# Patient Record
Sex: FEMALE | Race: BLACK OR AFRICAN AMERICAN | URBAN - METROPOLITAN AREA
[De-identification: names, ages, dates, MRNs, and addresses within clinical notes are randomized per-mention and may not be internally consistent; named-entity substitution may affect disease eponyms.]

---

## 2019-09-30 LAB — PAP SMEAR, EXTERNAL: NORMAL

## 2020-09-03 VITALS
BODY MASS INDEX: 21.66 KG/M2 | HEIGHT: 67 IN | HEART RATE: 81 BPM | SYSTOLIC BLOOD PRESSURE: 142 MMHG | WEIGHT: 138 LBS | DIASTOLIC BLOOD PRESSURE: 87 MMHG

## 2020-09-03 PROBLEM — F41.8 MIXED ANXIETY AND DEPRESSIVE DISORDER: Status: ACTIVE | Noted: 2020-09-03

## 2020-09-03 RX ORDER — PANTOPRAZOLE SODIUM 40 MG/1
40 TABLET, DELAYED RELEASE ORAL DAILY
COMMUNITY
End: 2022-10-20 | Stop reason: ALTCHOICE

## 2020-09-03 RX ORDER — SERTRALINE HYDROCHLORIDE 100 MG/1
TABLET, FILM COATED ORAL DAILY
COMMUNITY

## 2022-03-18 PROBLEM — F41.8 MIXED ANXIETY AND DEPRESSIVE DISORDER: Status: ACTIVE | Noted: 2020-09-03

## 2022-10-20 PROBLEM — N91.2 AMENORRHEA: Status: ACTIVE | Noted: 2022-10-20

## 2022-10-20 PROBLEM — L68.0 HIRSUTISM: Status: ACTIVE | Noted: 2022-10-20

## 2022-10-20 PROBLEM — K58.9 IRRITABLE BOWEL SYNDROME: Status: ACTIVE | Noted: 2018-07-18

## 2022-10-20 PROBLEM — L70.0 ACNE VULGARIS: Status: ACTIVE | Noted: 2022-10-20

## 2022-10-20 PROBLEM — L81.0 POST-INFLAMMATORY HYPERPIGMENTATION: Status: RESOLVED | Noted: 2022-10-20 | Resolved: 2022-10-20

## 2022-10-20 PROBLEM — L81.0 POST-INFLAMMATORY HYPERPIGMENTATION: Status: ACTIVE | Noted: 2022-10-20

## 2022-10-20 RX ORDER — OMEPRAZOLE 20 MG/1
20 CAPSULE, DELAYED RELEASE ORAL DAILY
COMMUNITY
End: 2022-10-21 | Stop reason: ALTCHOICE

## 2022-10-20 RX ORDER — NAPROXEN 500 MG/1
500 TABLET ORAL 2 TIMES DAILY
COMMUNITY
Start: 2022-04-28 | End: 2022-10-21 | Stop reason: ALTCHOICE

## 2022-10-20 NOTE — PROGRESS NOTES
Marcus Gómez is a 28 y.o. female is seen on 10/21/2022 for Establish Care and Strain (States to have hx of right sided chest pain, told it was doing to lifting on her job)    Assessment & Plan:     1. Right-sided chest pain  Assessment & Plan:  Persistent, exacerbated with activity  Referral to ortho to rule out musculoskeletal etiology  Orders:  -     CBC WITH AUTOMATED DIFF; Future  -     METABOLIC PANEL, COMPREHENSIVE; Future  -     REFERRAL TO ORTHOPEDICS  2. Anxiety disorder, unspecified type  Assessment & Plan:   Stable, continue management per specialist  Orders:  -     TSH 3RD GENERATION; Future  3. Recurrent major depressive disorder, in partial remission ([facility] [address]  Assessment & Plan:   Stable, continue management per specialist  Orders:  -     TSH 3RD GENERATION; Future  4. Screening for lipid disorders  -     LIPID PANEL; Future  5. Need for hepatitis C screening test  -     HEPATITIS C AB; Future  6. Encounter to establish care    Follow-up and Dispositions    Return in about 4 weeks (around 2022) for physical, lab results.         Subjective:     HPI    Previous PCP: Dr. Shelby Vo  Reason for switching: does not take insurance    Social Hx:  Occupation-   Household- parents  ETOH use- 4 times a week  Recreational drug use- marijuana, almost a month  Tobacco use- not current    Gyn Hx:  Last PAP: 2022  :   Miscarriage:0  : 0  Date of LMP: Last month, end of month  Hx of STDs: none  Birth Control: sprintec  Menopause:    Family Hx:  HTN- mother, MGM, PGM, MGF  Diabetes- MGM, MGF, PGM, PGF  HLD- none  MI- MGM, MGF, PGM, PGF  Stroke - none  Cancer- none  Mental Health Disorder- none  Autoimmune Disease- none    Preventive:  COVID-19 vac - received  Flu vaccine- declined  Tetanus vaccine- can't remember  MyChart activation - activated    Medical History/Health Concerns:    Right sided chest pain  Onset: last October  Location: right sternal border to breast  Duration: comes and goes  Characteristics: from lifting from her job,   Associated symptoms: no shortness of breath, 7/10  Aggravating factors:constantly lifting  Relieving factors: rest  Treatment: tries to take aleve, but does not work    Anxiety-  Patient is seen for anxiety  Associated symptoms:  see SUNG-7  Current treatment: sertraline 100 mg  Prescribed by possible psychiatry, unsure what provider is, seen during virtual visits  SUNG 2/7 10/21/2022   Feeling nervous, anxious, or on edge 1   Not being able to stop or control worrying 0   Worrying too much about different things 1   Trouble relaxing 0   Being so restless that it is hard to sit still 0   Becoming easily annoyed or irritable 1   Feeling afraid as if something awful might happen 0   SUNG-7 Total Score 3       Depression-  Patient is seen for followup of depression.    Ongoing symptoms include:  see PHQ-9  Current treatment: sertraline 100 mg daily  3 most recent PHQ Screens 10/21/2022   Little interest or pleasure in doing things More than half the days   Feeling down, depressed, irritable, or hopeless Several days   Total Score PHQ 2 3   Trouble falling or staying asleep, or sleeping too much Not at all   Feeling tired or having little energy Several days   Poor appetite, weight loss, or overeating Not at all   Feeling bad about yourself - or that you are a failure or have let yourself or your family down Several days   Trouble concentrating on things such as school, work, reading, or watching TV Not at all   Moving or speaking so slowly that other people could have noticed; or the opposite being so fidgety that others notice Not at all   Thoughts of being better off dead, or hurting yourself in some way Not at all   PHQ 9 Score 5   How difficult have these problems made it for you to do your work, take care of your home and get along with others Somewhat difficult     Review of Systems   Constitutional:  Negative for chills, fever and malaise/fatigue. Respiratory:  Negative for shortness of breath. Cardiovascular:  Negative for chest pain. Musculoskeletal:         Right sided chest pain     Objective:   /74 (BP 1 Location: Right upper arm, BP Patient Position: Sitting, BP Cuff Size: Adult)   Pulse 77   Temp 98.9 °F (37.2 °C) (Oral)   Resp 20   Ht 5' 7\" (1.702 m)   Wt 119 lb (54 kg)   LMP 09/28/2022   SpO2 99%   BMI 18.64 kg/m²     Physical Exam  Vitals and nursing note reviewed. Constitutional:       General: She is not in acute distress. Appearance: She is not ill-appearing. HENT:      Head: Normocephalic and atraumatic. Cardiovascular:      Rate and Rhythm: Normal rate and regular rhythm. Pulmonary:      Effort: Pulmonary effort is normal. No respiratory distress. Breath sounds: No wheezing, rhonchi or rales. Chest:      Chest wall: No mass or tenderness. Skin:     General: Skin is warm and dry. Neurological:      General: No focal deficit present. Mental Status: She is alert and oriented to person, place, and time. Psychiatric:         Mood and Affect: Mood normal.         Thought Content:  Thought content normal.         Judgment: Judgment normal.      MINGO Walters

## 2022-10-21 ENCOUNTER — OFFICE VISIT (OUTPATIENT)
Dept: FAMILY MEDICINE CLINIC | Age: 27
End: 2022-10-21
Payer: MEDICAID

## 2022-10-21 VITALS
RESPIRATION RATE: 20 BRPM | BODY MASS INDEX: 18.68 KG/M2 | SYSTOLIC BLOOD PRESSURE: 111 MMHG | WEIGHT: 119 LBS | OXYGEN SATURATION: 99 % | HEART RATE: 77 BPM | HEIGHT: 67 IN | DIASTOLIC BLOOD PRESSURE: 74 MMHG | TEMPERATURE: 98.9 F

## 2022-10-21 DIAGNOSIS — F33.41 RECURRENT MAJOR DEPRESSIVE DISORDER, IN PARTIAL REMISSION (HCC): ICD-10-CM

## 2022-10-21 DIAGNOSIS — Z11.59 NEED FOR HEPATITIS C SCREENING TEST: ICD-10-CM

## 2022-10-21 DIAGNOSIS — R07.9 RIGHT-SIDED CHEST PAIN: Primary | ICD-10-CM

## 2022-10-21 DIAGNOSIS — Z13.220 SCREENING FOR LIPID DISORDERS: ICD-10-CM

## 2022-10-21 DIAGNOSIS — Z76.89 ENCOUNTER TO ESTABLISH CARE: ICD-10-CM

## 2022-10-21 DIAGNOSIS — F41.9 ANXIETY DISORDER, UNSPECIFIED TYPE: ICD-10-CM

## 2022-10-21 PROBLEM — F32.A DEPRESSION: Status: ACTIVE | Noted: 2022-10-21

## 2022-10-21 PROBLEM — N91.2 AMENORRHEA: Status: RESOLVED | Noted: 2022-10-20 | Resolved: 2022-10-21

## 2022-10-21 PROCEDURE — 99204 OFFICE O/P NEW MOD 45 MIN: CPT

## 2022-10-21 RX ORDER — NORGESTIMATE AND ETHINYL ESTRADIOL 0.25-0.035
KIT ORAL
COMMUNITY
Start: 2022-10-21 | End: 2022-10-21

## 2022-10-21 RX ORDER — NORGESTIMATE AND ETHINYL ESTRADIOL 0.25-0.035
KIT ORAL
COMMUNITY
Start: 2022-10-21

## 2022-10-21 NOTE — PROGRESS NOTES
Glenny Ward presents today for   Chief Complaint   Patient presents with    Establish Care    Strain     States to have hx of right sided chest pain, told it was doing to lifting on her job       Glenny Ward preferred language for health care discussion is english/other. Is someone accompanying this pt? no    Is the patient using any DME equipment during [address]? no    Depression Screening:  3 most recent PHQ Screens 10/21/2022   Little interest or pleasure in doing things More than half the days   Feeling down, depressed, irritable, or hopeless Several days   Total Score PHQ 2 3   Trouble falling or staying asleep, or sleeping too much Not at all   Feeling tired or having little energy Several days   Poor appetite, weight loss, or overeating Not at all   Feeling bad about yourself - or that you are a failure or have let yourself or your family down Several days   Trouble concentrating on things such as school, work, reading, or watching TV Not at all   Moving or speaking so slowly that other people could have noticed; or the opposite being so fidgety that others notice Not at all   Thoughts of being better off dead, or hurting yourself in some way Not at all   PHQ 9 Score 5   How difficult have these problems made it for you to do your work, take care of your home and get along with others Somewhat difficult       Learning Assessment:  Learning Assessment 10/21/2022   PRIMARY LEARNER Patient   PRIMARY LANGUAGE ENGLISH   LEARNER PREFERENCE PRIMARY DEMONSTRATION   ANSWERED BY patient   RELATIONSHIP SELF       Abuse Screening:  Abuse Screening Questionnaire 10/21/2022   Do you ever feel afraid of your partner? N   Are you in a relationship with someone who physically or mentally threatens you? N   Is it safe for you to go home? Y       Generalized Anxiety  No flowsheet data found.       Health Maintenance Due   Topic Date Due    Hepatitis C Screening  Never done    Depression Screen  Never done    COVID-19 Vaccine (1) Never done    Flu Vaccine (1) Never done    Pap Smear  09/30/2022   . Health Maintenance reviewed and discussed and ordered per Provider. Advance Directive:  1. Do you have an advance directive in place?  Patient Reply:no

## 2022-11-03 ENCOUNTER — OFFICE VISIT (OUTPATIENT)
Dept: ORTHOPEDIC SURGERY | Age: 27
End: 2022-11-03
Payer: MEDICAID

## 2022-11-03 VITALS — HEIGHT: 67 IN | BODY MASS INDEX: 18.52 KG/M2 | WEIGHT: 118 LBS | RESPIRATION RATE: 14 BRPM

## 2022-11-03 DIAGNOSIS — M94.0 COSTOCHONDRITIS: Primary | ICD-10-CM

## 2022-11-03 PROCEDURE — 97110 THERAPEUTIC EXERCISES: CPT | Performed by: FAMILY MEDICINE

## 2022-11-03 PROCEDURE — 99204 OFFICE O/P NEW MOD 45 MIN: CPT | Performed by: FAMILY MEDICINE

## 2022-11-03 RX ORDER — DICLOFENAC SODIUM 50 MG/1
50 TABLET, DELAYED RELEASE ORAL 2 TIMES DAILY
Qty: 60 TABLET | Refills: 1 | Status: SHIPPED | OUTPATIENT
Start: 2022-11-03

## 2022-11-03 NOTE — LETTER
11/3/2022    Patient: Ottoniel Castellano   YOB: 1995   Date of Visit: 11/3/2022     Darryle Pick, NP-C  [phone] In Basket    Dear Darryle Pick, NP-C,      Thank you for referring Ms. Ezequiel Ware to [place] [id number]. for evaluation. My notes for this consultation are attached. If you have questions, please do not hesitate to call me. I look forward to following your patient along with you.       Sincerely,    Sharyn Bradley, DO

## 2022-11-03 NOTE — PROGRESS NOTES
HISTORY OF PRESENT ILLNESS    Erich Little 1995 is a 28y.o. year old female comes in today as new patient for: right upper chest pain    Patients symptoms have been present for 1+ years. Pain level 5/10 upper right chest. 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 Patient has tried:  icy hot, aleve without benefit. It is described as pain in chest during/after delivering but resolves after. Past Surgical History:   Procedure Laterality Date    HX HERNIA REPAIR      w/mesh    HX WISDOM TEETH EXTRACTION       Social History     Socioeconomic History    Marital status: SINGLE   Tobacco Use    Smoking status: Former     Types: Cigarettes     Passive exposure: Never    Smokeless tobacco: Never   Vaping Use    Vaping Use: Former   Substance and Sexual Activity    Alcohol use: Yes     Comment: occasional    Drug use: Yes     Types: Marijuana    Sexual activity: Not Currently     Social Determinants of Health     Physical Activity: Sufficiently Active    Days of Exercise per Week: 4 days    Minutes of Exercise per Session: 150+ min      Current Outpatient Medications   Medication Sig Dispense Refill    norgestimate-ethinyl estradioL (ORTHO-CYCLEN, SPRINTEC) 0.25-35 mg-mcg tab 1 tablet      sertraline (ZOLOFT) 100 mg tablet Take  by mouth daily. Past Medical History:   Diagnosis Date    Anxiety     Depression     HSV-1 (herpes simplex virus 1) infection      Family History   Problem Relation Age of Onset    Hypertension Mother     Diabetes Maternal Grandmother     Hypertension Maternal Grandmother     Diabetes Paternal Grandmother     Hypertension Paternal Grandmother          ROS:  No SOB, palps, radiation, N, V      Objective:  Resp 14   Ht 5' 7\" (1.702 m)   Wt 118 lb (53.5 kg)   BMI 18.48 kg/m²   NEURO:  Sensation intact light touch upper and lower extremities. Biceps & Triceps reflexes +2/4 bilaterally.   Patellar and Achilles +2/4  M/S: Examined seating and supine. Spurling negative right. Cervical rotation Normal Rib(s) 3, 4 right TTP and anterior Strength +5/5 Bilateral upper and lower extremities. Assessment/Plan:     ICD-10-CM ICD-9-CM    1. Costochondritis  M94.0 733.6 diclofenac EC (VOLTAREN) 50 mg EC tablet      IA THERAPEUTIC EXERCISES        Therapeutic Exercises:  Deficits: include, but not limited to, loss of strength of integral muscle components and/or ROM loss documented to affected activities Right upper ribs    Target area: Right chest    GOALS: increase/improve flexibility, ROM    I personally went over exercise program with Anuradha Rm. Exercises taught and demonstrated by myself, then performed by patient. Exercise modified as required to optimize benefit based on any potential limits present. Sets/Reps Assigned: Stretches 4 sets daily each held 30+ seconds. Patient (or guardian if minor) verbalizes understanding of evaluation and plan. Will start HEP and Rx for voltaren 50mg  as above and plan follow-up 4 weeks.

## 2022-11-04 ENCOUNTER — HOSPITAL ENCOUNTER (OUTPATIENT)
Dept: LAB | Age: 27
Discharge: HOME | End: 2022-11-04

## 2022-11-04 LAB — XX-LABCORP SPECIMEN COL,LCBCF: NORMAL

## 2022-11-04 PROCEDURE — 99001 SPECIMEN HANDLING PT-LAB: CPT

## 2022-12-01 ENCOUNTER — OFFICE VISIT (OUTPATIENT)
Dept: ORTHOPEDIC SURGERY | Age: 27
End: 2022-12-01
Payer: MEDICAID

## 2022-12-01 VITALS — BODY MASS INDEX: 18.79 KG/M2 | WEIGHT: 120 LBS

## 2022-12-01 DIAGNOSIS — M94.0 COSTOCHONDRITIS: Primary | ICD-10-CM

## 2022-12-01 PROCEDURE — 99213 OFFICE O/P EST LOW 20 MIN: CPT | Performed by: FAMILY MEDICINE

## 2022-12-01 NOTE — PROGRESS NOTES
HISTORY OF PRESENT ILLNESS    Cecil Hernandez 1995 is a 28y.o. year old female comes in today to be evaluated and treated for: right ribs/upper chest pain    Since last appt has noticed pain is resolved after manip. Pain level 0 - No pain/10. Never had to use voltaren 50mg and stretching piror helped. 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 Past Surgical History:   Procedure Laterality Date    HX HERNIA REPAIR      w/mesh    HX WISDOM TEETH EXTRACTION       Social History     Socioeconomic History    Marital status: SINGLE   Tobacco Use    Smoking status: Former     Types: Cigarettes     Passive exposure: Never    Smokeless tobacco: Never   Vaping Use    Vaping Use: Former   Substance and Sexual Activity    Alcohol use: Yes     Comment: occasional    Drug use: Yes     Types: Marijuana    Sexual activity: Not Currently     Social Determinants of Health     Physical Activity: Sufficiently Active    Days of Exercise per Week: 4 days    Minutes of Exercise per Session: 150+ min     Current Outpatient Medications   Medication Sig Dispense Refill    diclofenac EC (VOLTAREN) 50 mg EC tablet Take 1 Tablet by mouth two (2) times a day. 60 Tablet 1    norgestimate-ethinyl estradioL (ORTHO-CYCLEN, SPRINTEC) 0.25-35 mg-mcg tab 1 tablet      sertraline (ZOLOFT) 100 mg tablet Take  by mouth daily. Past Medical History:   Diagnosis Date    Anxiety     Depression     HSV-1 (herpes simplex virus 1) infection      Family History   Problem Relation Age of Onset    Hypertension Mother     Diabetes Maternal Grandmother     Hypertension Maternal Grandmother     Diabetes Paternal Grandmother     Hypertension Paternal Grandmother          ROS:  NO numb    Objective: Wt 120 lb (54.4 kg)   BMI 18.79 kg/m²   NEURO:  Sensation intact light touch upper and lower extremities. Biceps & Triceps reflexes +2/4 bilaterally. Patellar and Achilles +2/4  M/S:  Examined seating and supine.   Spurling negative right.  Cervical rotation Normal Rib(s) 3, 4 right TTP and anterior Strength +5/5 Bilateral upper and lower extremities. Assessment/Plan:     ICD-10-CM ICD-9-CM    1. Costochondritis  M94.0 733.6           Patient (or guardian if minor) verbalizes understanding of evaluation and plan. Will continue current as Sx resolved as above and plan follow-up as needed.

## 2022-12-01 NOTE — LETTER
12/1/2022    Patient: Cecil Hernandez   YOB: 1995   Date of Visit: 12/1/2022     June Duvall, NP-C  [phone]  [address] In Basket    Dear MINGO Castillo,      Thank you for referring Ms. Rose Seen to [facility]. for evaluation. My notes for this consultation are attached. If you have questions, please do not hesitate to call me. I look forward to following your patient along with you.       Sincerely,    Dunia Mohr, DO

## 2024-12-27 ENCOUNTER — OFFICE VISIT (OUTPATIENT)
Facility: CLINIC | Age: 29
End: 2024-12-27

## 2024-12-27 VITALS
SYSTOLIC BLOOD PRESSURE: 132 MMHG | WEIGHT: 132.4 LBS | OXYGEN SATURATION: 97 % | DIASTOLIC BLOOD PRESSURE: 82 MMHG | BODY MASS INDEX: 20.78 KG/M2 | TEMPERATURE: 97.9 F | RESPIRATION RATE: 20 BRPM | HEART RATE: 72 BPM | HEIGHT: 67 IN

## 2024-12-27 DIAGNOSIS — Z00.00 ANNUAL PHYSICAL EXAM: ICD-10-CM

## 2024-12-27 DIAGNOSIS — F40.11 GENERALIZED SOCIAL PHOBIA: Primary | ICD-10-CM

## 2024-12-27 DIAGNOSIS — Z11.4 SCREENING FOR HIV WITHOUT PRESENCE OF RISK FACTORS: ICD-10-CM

## 2024-12-27 DIAGNOSIS — F10.10 ALCOHOL ABUSE: ICD-10-CM

## 2024-12-27 PROBLEM — L70.0 ACNE VULGARIS: Status: RESOLVED | Noted: 2022-10-20 | Resolved: 2024-12-27

## 2024-12-27 PROBLEM — L68.0 HIRSUTISM: Status: RESOLVED | Noted: 2022-10-20 | Resolved: 2024-12-27

## 2024-12-27 PROBLEM — R07.9 RIGHT-SIDED CHEST PAIN: Status: RESOLVED | Noted: 2022-10-21 | Resolved: 2024-12-27

## 2024-12-27 RX ORDER — NALTREXONE HYDROCHLORIDE 50 MG/1
50 TABLET, FILM COATED ORAL DAILY
COMMUNITY
Start: 2024-11-19

## 2024-12-27 SDOH — ECONOMIC STABILITY: INCOME INSECURITY: HOW HARD IS IT FOR YOU TO PAY FOR THE VERY BASICS LIKE FOOD, HOUSING, MEDICAL CARE, AND HEATING?: NOT HARD AT ALL

## 2024-12-27 SDOH — ECONOMIC STABILITY: FOOD INSECURITY: WITHIN THE PAST 12 MONTHS, YOU WORRIED THAT YOUR FOOD WOULD RUN OUT BEFORE YOU GOT MONEY TO BUY MORE.: NEVER TRUE

## 2024-12-27 SDOH — ECONOMIC STABILITY: FOOD INSECURITY: WITHIN THE PAST 12 MONTHS, THE FOOD YOU BOUGHT JUST DIDN'T LAST AND YOU DIDN'T HAVE MONEY TO GET MORE.: NEVER TRUE

## 2024-12-27 ASSESSMENT — PATIENT HEALTH QUESTIONNAIRE - PHQ9
SUM OF ALL RESPONSES TO PHQ9 QUESTIONS 1 & 2: 0
4. FEELING TIRED OR HAVING LITTLE ENERGY: NEARLY EVERY DAY
10. IF YOU CHECKED OFF ANY PROBLEMS, HOW DIFFICULT HAVE THESE PROBLEMS MADE IT FOR YOU TO DO YOUR WORK, TAKE CARE OF THINGS AT HOME, OR GET ALONG WITH OTHER PEOPLE: NOT DIFFICULT AT ALL
SUM OF ALL RESPONSES TO PHQ QUESTIONS 1-9: 6
2. FEELING DOWN, DEPRESSED OR HOPELESS: NOT AT ALL
9. THOUGHTS THAT YOU WOULD BE BETTER OFF DEAD, OR OF HURTING YOURSELF: NOT AT ALL
SUM OF ALL RESPONSES TO PHQ QUESTIONS 1-9: 6
1. LITTLE INTEREST OR PLEASURE IN DOING THINGS: NOT AT ALL
3. TROUBLE FALLING OR STAYING ASLEEP: NEARLY EVERY DAY
6. FEELING BAD ABOUT YOURSELF - OR THAT YOU ARE A FAILURE OR HAVE LET YOURSELF OR YOUR FAMILY DOWN: NOT AT ALL
8. MOVING OR SPEAKING SO SLOWLY THAT OTHER PEOPLE COULD HAVE NOTICED. OR THE OPPOSITE, BEING SO FIGETY OR RESTLESS THAT YOU HAVE BEEN MOVING AROUND A LOT MORE THAN USUAL: NOT AT ALL
7. TROUBLE CONCENTRATING ON THINGS, SUCH AS READING THE NEWSPAPER OR WATCHING TELEVISION: NOT AT ALL
5. POOR APPETITE OR OVEREATING: NOT AT ALL

## 2024-12-27 ASSESSMENT — ANXIETY QUESTIONNAIRES
7. FEELING AFRAID AS IF SOMETHING AWFUL MIGHT HAPPEN: NOT AT ALL
GAD7 TOTAL SCORE: 0
5. BEING SO RESTLESS THAT IT IS HARD TO SIT STILL: NOT AT ALL
6. BECOMING EASILY ANNOYED OR IRRITABLE: NOT AT ALL
4. TROUBLE RELAXING: NOT AT ALL
IF YOU CHECKED OFF ANY PROBLEMS ON THIS QUESTIONNAIRE, HOW DIFFICULT HAVE THESE PROBLEMS MADE IT FOR YOU TO DO YOUR WORK, TAKE CARE OF THINGS AT HOME, OR GET ALONG WITH OTHER PEOPLE: NOT DIFFICULT AT ALL
2. NOT BEING ABLE TO STOP OR CONTROL WORRYING: NOT AT ALL
1. FEELING NERVOUS, ANXIOUS, OR ON EDGE: NOT AT ALL
3. WORRYING TOO MUCH ABOUT DIFFERENT THINGS: NOT AT ALL

## 2024-12-27 NOTE — PROGRESS NOTES
Maintenance Due   Topic Date Due    Depression Monitoring  Never done    Varicella vaccine (1 of 2 - 13+ 2-dose series) Never done    HIV screen  Never done    Hepatitis B vaccine (1 of 3 - 19+ 3-dose series) Never done    Flu vaccine (1) Never done    COVID-19 Vaccine (1 - 2023-24 season) Never done    Cervical cancer screen  10/17/2024   .      \"Have you been to the ER, urgent care clinic since your last visit?  Hospitalized since your last visit?\"    NO    “Have you seen or consulted any other health care providers outside of [facility] since your last visit?”    NO        “Have you had a pap smear?”    NO    Date of last Cervical Cancer screen (HPV or PAP): 9/30/2019         
Standing Expiration Date:   12/27/2025    Comprehensive Metabolic Panel     Standing Status:   Future     Standing Expiration Date:   12/27/2025       Return in about 1 year (around 12/27/2025) for extended visit 30 minutes.      I have discussed the diagnosis with the patient and the intended plan as seen in the above orders.  The patient has received an after-visit summary and questions were answered concerning future plans.  I have discussed medication side effects and warnings with the patient as well. I have reviewed the plan of care with the patient, accepted their input and they are in agreement with the treatment goals. Previous lab and imaging results were reviewed by me.     On this date 12/27/24 I have spent 30 minutes reviewing previous notes, test results and face to face with the patient for interview/exam, discussing working diagnosis and treatment plan as well as documenting on the day of the visit.       Brennen Cifuentes MD  December 27, 2024

## 2024-12-31 LAB
ALBUMIN SERPL-MCNC: 4.2 G/DL (ref 4–5)
ALP SERPL-CCNC: 55 IU/L (ref 44–121)
ALT SERPL-CCNC: 10 IU/L (ref 0–32)
AST SERPL-CCNC: 12 IU/L (ref 0–40)
BASOPHILS # BLD AUTO: 0 X10E3/UL (ref 0–0.2)
BASOPHILS NFR BLD AUTO: 0 %
BILIRUB SERPL-MCNC: <0.2 MG/DL (ref 0–1.2)
BUN SERPL-MCNC: 8 MG/DL (ref 6–20)
BUN/CREAT SERPL: 14 (ref 9–23)
CALCIUM SERPL-MCNC: 8.8 MG/DL (ref 8.7–10.2)
CHLORIDE SERPL-SCNC: 104 MMOL/L (ref 96–106)
CO2 SERPL-SCNC: 22 MMOL/L (ref 20–29)
CREAT SERPL-MCNC: 0.59 MG/DL (ref 0.57–1)
EGFRCR SERPLBLD CKD-EPI 2021: 124 ML/MIN/1.73
EOSINOPHIL # BLD AUTO: 0.1 X10E3/UL (ref 0–0.4)
EOSINOPHIL NFR BLD AUTO: 2 %
ERYTHROCYTE [DISTWIDTH] IN BLOOD BY AUTOMATED COUNT: 16.6 % (ref 11.7–15.4)
GLOBULIN SER CALC-MCNC: 2.2 G/DL (ref 1.5–4.5)
GLUCOSE SERPL-MCNC: 68 MG/DL (ref 70–99)
HCT VFR BLD AUTO: 34.2 % (ref 34–46.6)
HGB BLD-MCNC: 10.7 G/DL (ref 11.1–15.9)
HIV 1+2 AB+HIV1 P24 AG SERPL QL IA: NON REACTIVE
IMM GRANULOCYTES # BLD AUTO: 0 X10E3/UL (ref 0–0.1)
IMM GRANULOCYTES NFR BLD AUTO: 0 %
LYMPHOCYTES # BLD AUTO: 1.6 X10E3/UL (ref 0.7–3.1)
LYMPHOCYTES NFR BLD AUTO: 29 %
MCH RBC QN AUTO: 25 PG (ref 26.6–33)
MCHC RBC AUTO-ENTMCNC: 31.3 G/DL (ref 31.5–35.7)
MCV RBC AUTO: 80 FL (ref 79–97)
MONOCYTES # BLD AUTO: 0.4 X10E3/UL (ref 0.1–0.9)
MONOCYTES NFR BLD AUTO: 7 %
NEUTROPHILS # BLD AUTO: 3.3 X10E3/UL (ref 1.4–7)
NEUTROPHILS NFR BLD AUTO: 62 %
PLATELET # BLD AUTO: 271 X10E3/UL (ref 150–450)
POTASSIUM SERPL-SCNC: 4.9 MMOL/L (ref 3.5–5.2)
PROT SERPL-MCNC: 6.4 G/DL (ref 6–8.5)
RBC # BLD AUTO: 4.28 X10E6/UL (ref 3.77–5.28)
SODIUM SERPL-SCNC: 140 MMOL/L (ref 134–144)
SPECIMEN STATUS REPORT: NORMAL
WBC # BLD AUTO: 5.4 X10E3/UL (ref 3.4–10.8)